# Patient Record
Sex: MALE | Race: WHITE | NOT HISPANIC OR LATINO
[De-identification: names, ages, dates, MRNs, and addresses within clinical notes are randomized per-mention and may not be internally consistent; named-entity substitution may affect disease eponyms.]

---

## 2024-10-02 ENCOUNTER — NON-APPOINTMENT (OUTPATIENT)
Age: 24
End: 2024-10-02

## 2024-10-02 PROBLEM — Z00.00 ENCOUNTER FOR PREVENTIVE HEALTH EXAMINATION: Status: ACTIVE | Noted: 2024-10-02

## 2024-10-03 ENCOUNTER — APPOINTMENT (OUTPATIENT)
Dept: PHYSICAL MEDICINE AND REHAB | Facility: CLINIC | Age: 24
End: 2024-10-03
Payer: COMMERCIAL

## 2024-10-03 VITALS
WEIGHT: 171 LBS | DIASTOLIC BLOOD PRESSURE: 80 MMHG | SYSTOLIC BLOOD PRESSURE: 121 MMHG | BODY MASS INDEX: 23.16 KG/M2 | HEIGHT: 72 IN | RESPIRATION RATE: 18 BRPM

## 2024-10-03 DIAGNOSIS — Z78.9 OTHER SPECIFIED HEALTH STATUS: ICD-10-CM

## 2024-10-03 DIAGNOSIS — M79.18 MYALGIA, OTHER SITE: ICD-10-CM

## 2024-10-03 DIAGNOSIS — M77.11 LATERAL EPICONDYLITIS, RIGHT ELBOW: ICD-10-CM

## 2024-10-03 DIAGNOSIS — M25.511 PAIN IN RIGHT SHOULDER: ICD-10-CM

## 2024-10-03 DIAGNOSIS — F17.200 NICOTINE DEPENDENCE, UNSPECIFIED, UNCOMPLICATED: ICD-10-CM

## 2024-10-03 DIAGNOSIS — M25.521 PAIN IN RIGHT ELBOW: ICD-10-CM

## 2024-10-03 DIAGNOSIS — M77.8 OTHER ENTHESOPATHIES, NOT ELSEWHERE CLASSIFIED: ICD-10-CM

## 2024-10-03 DIAGNOSIS — F12.90 CANNABIS USE, UNSPECIFIED, UNCOMPLICATED: ICD-10-CM

## 2024-10-03 PROCEDURE — 20552 NJX 1/MLT TRIGGER POINT 1/2: CPT

## 2024-10-03 PROCEDURE — 99204 OFFICE O/P NEW MOD 45 MIN: CPT | Mod: 25

## 2024-10-03 RX ORDER — DICLOFENAC SODIUM 10 MG/G
1 GEL TOPICAL DAILY
Qty: 1 | Refills: 2 | Status: ACTIVE | COMMUNITY
Start: 2024-10-03 | End: 1900-01-01

## 2024-10-03 NOTE — ASSESSMENT
[FreeTextEntry1] :   PLAN AND RECOMMENDATIONS :   We discussed differential diagnosis and clinical impression  advise rel rest shoulder -most likely sprain with squash and now a bit of tendonitis  The patient was given handouts to read on this condition,helpful hints ,exercises etc  all questions answered given bands to work with  has very tender Trigger points  R shoulder blade area -offered TP injections today -accepted  no complications after  wrt R tennis elbow also overuse  advise brace  ice daily 20 mins  diclofenac cream    Recommend .symptomatic care and support tennis elbow brace   medications topical nsaid   imaging not needed yet     hydrotherapy /heat / cold for pain  continue  ergonomic precautions including pacing ,posture and frequent breaks while typing.  relative rest and avoidance of painful activity where possible  increasing activity as discussed  return for follow up.3 weeks  if not better with get US shoulder

## 2024-10-03 NOTE — PHYSICAL EXAM
[FreeTextEntry1] : PHYSICAL EXAM : OBJECTIVE   GENERAL : Awake ,alert and oriented to time place and person  HEAD : normocephalic and atraumatic  NECK : supple ,no tracheal deviation ,no thyroid enlargement noted with swallowing EYES : sclera and conjunctiva normal no redness,intact extraocular movements  ENT  : ears and nose normal in appearance -hearing adequate  PULMONARY: effort normal. No respiratory distress. breathing regular. No wheezes  LYMPH : No swelling in limbs, capillary return within normal range  CVS : warm extremities,no palpitations,not short of breath, no visible jugular venous distention PSYCH : mood and affect normal ,good eye contact ,normal attention  ABDOMEN : no visible distension ,  NEUROLOGICAL:cranial nerves intact,muscle tone normal,gait and balance safe except where noted below  SKIN : warm and dry No rash detected over specific body areas examined  MUSCULOSKELETAL: normal muscle bulk, no focal bony tenderness /posture normal except where specified below trigger points R traps and rhomboid ++ pain with IR shoulder R non tender elbow  ROm full shoulder elbow

## 2024-10-03 NOTE — HISTORY OF PRESENT ILLNESS
[FreeTextEntry1] :  Mr. ALEXANDR BERMEO is a very pleasant RHD 24-year male who comes in for evaluation of R arm shoulder and elbow  pain that has been ongoing for 2 weeks without any specific injury or inciting event but noticed after he fell backwards after a squash game. The pain is located primarily shoulder going down to the elbow intermittent in nature and described as throbbing, sharp, achy - R shoulder. The pain is rated as 0/10 during today's visit, and ranges from 0-5/10. The patient's symptoms are aggravated by moving and alleviated by rest. The patient denies any night pain, numbness/tingling, weakness, or bowel/bladder dysfunction. The patient has no other complaints at this time.   no treatment, no imaging    The patient works as an investment  banker

## 2024-10-03 NOTE — PROCEDURE
[de-identified] : Procedure: Trigger Point Injection - x 3  R rhomboids and traps    Injectate: A Total of 5mL consisting of 1 mL Kenalog (40mg/mL) and 4 mL 1% Lidocaine Kenalog : LOT number: [id number], Exp: 11/2025 NDC number: 2693953-52   see clinical diagram for injection sites   After risks, benefits and alternatives were discussed, the patient gave informed consent. The trigger points were palpated to reproduce radiating pain and the areas were marked using a depression made by tip of the needle cap. Alcohol was used to sterilize the area.  A 25 gauge needle was used to inject the above mentioned injectate among the identified trigger points, the needle was moved in multiple directions to induce spasm in the muscle.  There was no blood in the hub on aspiration. The needle was redirected and the remaining solution was injected. After applying direct pressure over the area, and verifying appropriate hemostasis a band-aid was used to cover the injection site.  There were no complications during or after the procedure. The patient reported improvement in pain after procedure and was instructed to ice area as needed.

## 2024-10-03 NOTE — REVIEW OF SYSTEMS
[Patient Intake Form Reviewed] : Patient intake form was reviewed [Joint Pain] : joint pain [Muscle Pain] : muscle pain [Fever] : no fever [Chills] : no chills [Skin Rash] : no skin rash [Skin Wound] : no skin wound